# Patient Record
Sex: MALE | Race: WHITE | NOT HISPANIC OR LATINO | Employment: OTHER | URBAN - METROPOLITAN AREA
[De-identification: names, ages, dates, MRNs, and addresses within clinical notes are randomized per-mention and may not be internally consistent; named-entity substitution may affect disease eponyms.]

---

## 2022-01-24 ENCOUNTER — TELEPHONE (OUTPATIENT)
Dept: PULMONOLOGY | Facility: CLINIC | Age: 77
End: 2022-01-24
Payer: MEDICARE

## 2022-01-24 NOTE — TELEPHONE ENCOUNTER
Returned patient call his referral was received awaiting for it to be processed in system and appointment to be scheduled will notify once done. LG  ----- Message from Anastasiia Wagner sent at 1/24/2022  8:33 AM CST -----  Contact: PT      Who Called: Ag     Does the patient know what this is regarding?: [facility]  Would the patient rather a call back or a response via [facility]?  Callback   Best Call Back Number: .[phone] (home)      Additional Information:

## 2022-01-28 DIAGNOSIS — R06.02 SOB (SHORTNESS OF BREATH): Primary | ICD-10-CM

## 2022-02-01 DIAGNOSIS — R06.02 SOB (SHORTNESS OF BREATH): Primary | ICD-10-CM

## 2022-02-09 ENCOUNTER — CLINICAL SUPPORT (OUTPATIENT)
Dept: PULMONOLOGY | Facility: CLINIC | Age: 77
End: 2022-02-09
Payer: MEDICARE

## 2022-02-09 ENCOUNTER — OFFICE VISIT (OUTPATIENT)
Dept: PULMONOLOGY | Facility: CLINIC | Age: 77
End: 2022-02-09
Payer: MEDICARE

## 2022-02-09 VITALS
RESPIRATION RATE: 18 BRPM | BODY MASS INDEX: 28.23 KG/M2 | SYSTOLIC BLOOD PRESSURE: 118 MMHG | HEART RATE: 96 BPM | DIASTOLIC BLOOD PRESSURE: 72 MMHG | WEIGHT: 220 LBS | OXYGEN SATURATION: 98 % | HEIGHT: 74 IN

## 2022-02-09 DIAGNOSIS — R06.02 SOB (SHORTNESS OF BREATH): ICD-10-CM

## 2022-02-09 DIAGNOSIS — N18.9 CHRONIC KIDNEY DISEASE, UNSPECIFIED CKD STAGE: ICD-10-CM

## 2022-02-09 DIAGNOSIS — R06.02 SOB (SHORTNESS OF BREATH): Primary | ICD-10-CM

## 2022-02-09 DIAGNOSIS — R07.81 PLEURITIC CHEST PAIN: ICD-10-CM

## 2022-02-09 PROCEDURE — 94726 PLETHYSMOGRAPHY LUNG VOLUMES: CPT | Mod: S$GLB,,, | Performed by: INTERNAL MEDICINE

## 2022-02-09 PROCEDURE — 99205 OFFICE O/P NEW HI 60 MIN: CPT | Mod: 25,S$GLB,, | Performed by: INTERNAL MEDICINE

## 2022-02-09 PROCEDURE — 99205 PR OFFICE/OUTPT VISIT, NEW, LEVL V, 60-74 MIN: ICD-10-PCS | Mod: 25,S$GLB,, | Performed by: INTERNAL MEDICINE

## 2022-02-09 PROCEDURE — 94729 PR C02/MEMBANE DIFFUSE CAPACITY: ICD-10-PCS | Mod: S$GLB,,, | Performed by: INTERNAL MEDICINE

## 2022-02-09 PROCEDURE — 94726 PULM FUNCT TST PLETHYSMOGRAP: ICD-10-PCS | Mod: S$GLB,,, | Performed by: INTERNAL MEDICINE

## 2022-02-09 PROCEDURE — 94060 PR EVAL OF BRONCHOSPASM: ICD-10-PCS | Mod: S$GLB,,, | Performed by: INTERNAL MEDICINE

## 2022-02-09 PROCEDURE — 94060 EVALUATION OF WHEEZING: CPT | Mod: S$GLB,,, | Performed by: INTERNAL MEDICINE

## 2022-02-09 PROCEDURE — 94729 DIFFUSING CAPACITY: CPT | Mod: S$GLB,,, | Performed by: INTERNAL MEDICINE

## 2022-02-09 RX ORDER — ALBUTEROL SULFATE 90 UG/1
2 AEROSOL, METERED RESPIRATORY (INHALATION) EVERY 4 HOURS PRN
COMMUNITY
Start: 2021-11-26

## 2022-02-09 RX ORDER — METFORMIN HYDROCHLORIDE 1000 MG/1
1000 TABLET ORAL 2 TIMES DAILY WITH MEALS
COMMUNITY

## 2022-02-09 RX ORDER — ATORVASTATIN CALCIUM 80 MG/1
80 TABLET, FILM COATED ORAL DAILY
COMMUNITY

## 2022-02-09 RX ORDER — CLOPIDOGREL BISULFATE 75 MG/1
75 TABLET ORAL DAILY
COMMUNITY

## 2022-02-09 RX ORDER — PREDNISONE 20 MG/1
20 TABLET ORAL 2 TIMES DAILY
Qty: 20 TABLET | Refills: 0 | OUTPATIENT
Start: 2022-02-09 | End: 2022-02-19

## 2022-02-09 RX ORDER — FENOFIBRATE 48 MG/1
145 TABLET, FILM COATED ORAL DAILY
COMMUNITY

## 2022-02-09 RX ORDER — LISINOPRIL 40 MG/1
40 TABLET ORAL DAILY
COMMUNITY

## 2022-02-09 RX ORDER — AMLODIPINE BESYLATE 5 MG/1
5 TABLET ORAL 2 TIMES DAILY
COMMUNITY
Start: 2021-12-28

## 2022-02-09 NOTE — PROGRESS NOTES
Subjective:    Patient Identification:   Patient ID: Ag Villalba is a 76 y.o. male.    Referring Provider:       Chief Complaint:  Pneumonia and Shortness of Breath      History of Present Illness:    Ag Villalba is a 76 y.o. male who presents for the evaluation of above-mentioned problem.  The patient has never smoked.  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  He denies any chest or neck trauma.  He has dogs and cats at home and denies any exposure to exotic animals or recent travel.  He has never had any blood clots.  He has significant cardiac history with 3 stents placed and does take clopidogrel and sees Dr. Enriquez.  He denies any lower extremity swelling but has occasionally felt dizzy.  He does have a systolic murmur and is aware of it.  In November he was diagnosed with pneumonia and his symptoms were right-sided sharp chest pain associated with deep breathing, no fever, no cough and negative for COVID-19.  He mentions that he has been short of breath even prior to this episode but since then he has felt more short of breath with pleuritic chest pain all the time.  He even while sitting he would have chest pain.  He mentions that he saw Dr. Enriquez in August or September of last year and there was an echocardiogram performed.  He is scheduled to see him in March or April.  His pain is not reproducible.  He [facility] of the chest with contrast but he is not sure about the results.    Review of Systems:  Review of Systems   Constitutional: Negative for fever, chills, weight loss, weight gain, activity change, appetite change, fatigue, night sweats and weakness.   HENT: Negative for nosebleeds, postnasal drip, rhinorrhea, sinus pressure, sore throat, trouble swallowing, voice change, congestion, ear pain and hearing loss.    Eyes: Negative for redness and itching.    Respiratory: Positive for shortness of breath and pleurisy. Negative for cough, hemoptysis, sputum production, choking, chest tightness, wheezing, orthopnea, previous hospitialization due to pulmonary problems, asthma nighttime symptoms, dyspnea on extertion, use of rescue inhaler and Paroxysmal Nocturnal Dyspnea.    Cardiovascular: Negative for chest pain, palpitations and leg swelling.   Genitourinary: Negative for difficulty urinating and hematuria.   Endocrine: Negative for polydipsia, polyphagia, cold intolerance, heat intolerance and polyuria.    Musculoskeletal: Negative for arthralgias, back pain, gait problem, joint swelling and myalgias.   Skin: Negative for rash.   Gastrointestinal: Negative for nausea, vomiting, abdominal pain, abdominal distention and acid reflux.   Neurological: Negative for dizziness, syncope, weakness, light-headedness and headaches.   Hematological: Negative for adenopathy. Does not bruise/bleed easily and no excessive bruising.   Psychiatric/Behavioral: Negative for confusion and sleep disturbance. The patient is not nervous/anxious.          Allergies: Review of patient's allergies indicates:  No Known Allergies    Medications:      Aloe clip 10, calcium/vitamin-D, clopidogrel, cyclobenzaprine, gabapentin, glipizide, hydroxyzine, metformin, lisinopril, atorvastatin, fenofibrate  Past Medical History:      Coronary artery disease, aortic stenosis, carpal tunnel syndrome, chronic kidney disease, hypertension, hearing loss, hyperlipidemia, type 2 diabetes mellitus    Family History:      No family history of cancer     Social History:      Past Surgical History:   Procedure Laterality Date    CARDIAC CATHETERIZATION         Physical Exam:  Vitals:    02/09/22 0849   BP: 118/72   Pulse: 96   Resp: 18     Physical Exam   Constitutional: He is oriented to person, place, and time. He appears not cachectic. No distress.   HENT:   Head: Normocephalic.   Right Ear: External ear normal.    Left Ear: External ear normal.   Nose: Nose normal. No mucosal edema. No polyps.   Mouth/Throat: Oropharynx is clear and moist. Normal dentition. No oropharyngeal exudate.   Neck: No JVD present. No tracheal deviation present. No thyromegaly present.   Cardiovascular: Normal rate, regular rhythm and intact distal pulses. Exam reveals no gallop and no friction rub.   Murmur heard.  Pulmonary/Chest: Normal expansion, symmetric chest wall expansion, effort normal and breath sounds normal. No stridor. No respiratory distress. He has no decreased breath sounds. He has no wheezes. He has no rhonchi. He has no rales. Chest wall is not dull to percussion. He exhibits no tenderness. Negative for egophony. Negative for tactile fremitus.   Abdominal: Soft. Bowel sounds are normal. He exhibits no distension and no mass. There is no hepatosplenomegaly. There is no abdominal tenderness. There is no rebound and no guarding. No hernia.   Musculoskeletal:         General: No tenderness, deformity or edema. Normal range of motion.      Cervical back: Normal range of motion and neck supple.   Lymphadenopathy: No supraclavicular adenopathy is present.     He has no cervical adenopathy.     He has no axillary adenopathy.   Neurological: He is alert and oriented to person, place, and time. He has normal reflexes. He displays normal reflexes. No cranial nerve deficit. He exhibits normal muscle tone.   Skin: Skin is warm and dry. No rash noted. He is not diaphoretic. No cyanosis or erythema. No pallor. Nails show no clubbing.   Psychiatric: He has a normal mood and affect. His behavior is normal. Judgment and thought content normal.         X-Ray Chest PA And Lateral                                RADIOLOGY REPORT        PT NAME: ALEX ASHLEY      [facility]     : 1945 M 76             [address]    ACCT: [id number]                                              [facility] Rec #:  [id number]                                        [id number]    Patient Location: [facility].RAD/             Procedure: CHEST 2 VIEWS    REQUISITION #: [id number]      REPORT #: [id number]           DATE OF EXAM: 02/02/22    TIME OF EXAM: 0852           PROCEDURE: CHEST 2 VIEWS    CLINICAL DATA:    Right lung pain        TECHNIQUE:    Views:  A single AP view of the chest.    Limitations: The images are technically satisfactory.        COMPARISON:    No prior relevant studies are available.        FINDINGS:    Cardiovascular: Normal.        Lungs and pleura: Normal.        Mediastinal and hilar structures: Normal.        Osseous structures:    1. Degenerative changes noted in spine.        Additional findings: None seen.        IMPRESSION:    1.  Negative examination.            This document was created using a voice recognition transcribing system.   Incorrect words or phrases may have been missed during proof reading. Please   interpret accordingly or contact the radiologist for clarification if   necessary.        DICTATING PHYSICIAN:  INES TEMPLE MD                   Date Dictated: 02/02/22 0930        Signed By:  INES TEMPLE MD <Electronically signed by INES TEMPLE MD in OV>    Date Signed:  02/02/22 0930     CC: ESE POWER MD ; ESE POWER MD      ADMITTING PHYSICIAN:                                                                                                    ORDERING PHY: ESE POWER MD                                                                                                                                                      ATTENDING PHY: ESE POWER MD    Patient Status:  REG CLI    Admit Service Date: 02/02/22                Accessory Clinical Data:  Chest x-ray:  I have personally reviewed his chest x-ray from 02/02/2022 and I do not see any acute pulmonary infiltrates or pleural disease.    CT scan:  CT angiogram report from 11/24/20[facility] from [facility] was  reviewed.  Patient had no evidence of pulmonary embolism and a small loculated right-sided pleural effusion with right lower lobe airspace disease with consolidation with peribronchial thickening.  There was mild left lung base atelectasis noted.  There are scattered pulmonary nodules measuring up to 6 mm.  Optional follow-up CT chest in 12 months was recommended.  Ascending aorta was 4.2 cm without any dissection.    PFTs:  Essentially unremarkable    6MWT:  None available    TTE:  None available    Lab data:    All radiographic imaging of the chest, PFT tracings/data, and 6MWT data have been independently reviewed and interpreted unless otherwise specified.     Assessment and Plan:        Problem List Items Addressed This Visit    None     Visit Diagnoses     SOB (shortness of breath)    -  Primary    Relevant Orders    CT Chest With Contrast    Creatinine, serum    Stress test, pulmonary    Pleuritic chest pain        Relevant Orders    CT Chest With Contrast    Creatinine, serum    Chronic kidney disease, unspecified CKD stage        Relevant Orders    Creatinine, serum         Orders Placed This Encounter   Procedures    CT Chest With Contrast     Standing Status:   Future     Standing Expiration Date:   2/9/2023     Order Specific Question:   Is the patient allergic to iodine or contrast?     Answer:   No     Order Specific Question:   Is the patient on ANY Metformin drug such as Glucophage/Glucovance?           Should be off drug 48 hours after contrast. Check renal function before restart.     Answer:   No     Order Specific Question:   History of Kidney Disease - including: decreased kidney function, dialysis, kidney transplay, single kidney, kidney cancer, kidney surgery?     Answer:   Decreased Kidney Function     Order Specific Question:   Does the patient have high blood pressure requiring medical treatment?     Answer:   Yes     Order Specific Question:   Diabetes?     Answer:   Yes     Order Specific  Question:   May the Radiologist modify the order per protocol to meet the clinical needs of the patient?     Answer:   Yes    Creatinine, serum     Standing Status:   Future     Number of Occurrences:   1     Standing Expiration Date:   4/10/2023    Stress test, pulmonary     Standing Status:   Future     Standing Expiration Date:   2/9/2023     Order Specific Question:   Reason for study     Answer:   Oxygen prescription      Retrieve CT angiogram from [place] 2021 from [facility].  Report received, reviewed and dictated.  Retrieve echocardiogram and cardiology progress notes from Dr. Enriquez is office, awaiting.  Will like to see the degree of aortic stenosis contributing to shortness of breath.    Unsure what the lung parenchyma and pleuritic process look on imaging at this point, with constant right-sided pleuritic chest pain and shortness of breath, will like to rule out pulmonary embolism and or pleuritic disease.  Will obtain CT angiogram.  If CT angiogram does not show any acute pulmonary emboli, will consider trial of steroids.    PFTs are within normal limits and current chest x-ray is unremarkable, unable to explain significant exertional as well as resting shortness of breath with normal lung functions.  Obtain 6 minutes walk test today.    More than 50% of 60 min time was spent face-to-face on counseling, in reviewing the imaging studies, reviewing notes from primary care provider, performing appropriate examination, counseling and educating the patient regarding the findings on CT scan, ordering medications and appropriate follow-up imaging studies, documenting clinical information in the electronic medical record, care coordination as well as communicating with the cardiology office and [facility].    Follow up in about 2 weeks (around 2/23/2022).  Thank you very much for involving me in the care of this patient.  Please do not hesitate to reach me if you have any further questions or  concerns.    This note is dictated on M*Modal word recognition program.  There are word recognition mistakes that are occasionally missed on review.     This is the addendum to this morning's note.  Reviewed the CT angiogram, patient has no evidence of pulmonary embolism.  There are multiple subcentimeter trick nodules the largest 4 mm on both the right and left side which will need to be monitored with a CT scan in 12 months.  There is a tiny right-sided pleural effusion with compression atelectasis and may be the cause of pleuritic chest pain.  This effusion is very small to be safe to put a needle there.  With the absence of infectious symptoms, it will be worthwhile to try some oral steroids or gabapentin.  Will send some steroids to his pharmacy.  Also received his echocardiogram results from his cardiologist office.  Paradoxical shortness of breath has been mentioned in those notes.  Echocardiogram from September 2021 shows a peak aortic valve gradient of 48 mm of mercury and a mean gradient of 28 mm of mercury.  There was trivial pericardial effusion without any evidence of tamponade.  Aortic valve peak velocity was 3.48 m/sec.  Aortic valve area based upon echo from 2020 was 1.36 cm sq.  ?  If shortness of breath is related to cardiac causes including moderate aortic valve stenosis.

## 2022-02-18 ENCOUNTER — TELEPHONE (OUTPATIENT)
Dept: PULMONOLOGY | Facility: CLINIC | Age: 77
End: 2022-02-18
Payer: MEDICARE

## 2022-02-18 NOTE — TELEPHONE ENCOUNTER
Faxed records as requested by patient. LG  ----- Message from Delaney Jurado sent at 2/18/2022  8:44 AM CST -----  Regarding: pt advice  Contact: Pt  Pt is calling to get copies of the xray and ct scan faxed to his cardiologist Dr Enriquez at [phone] Attmarc Fagan. Please call back at [phone]//thank you acc

## 2022-04-29 LAB
CREAT SERPL-MCNC: 1.53 MG/DL (ref 0.7–1.3)
GFR ESTIMATION: 44

## 2022-08-05 ENCOUNTER — OFFICE VISIT (OUTPATIENT)
Dept: PULMONOLOGY | Facility: CLINIC | Age: 77
End: 2022-08-05
Payer: MEDICARE

## 2022-08-05 ENCOUNTER — CLINICAL SUPPORT (OUTPATIENT)
Dept: PULMONOLOGY | Facility: CLINIC | Age: 77
End: 2022-08-05
Payer: MEDICARE

## 2022-08-05 VITALS
OXYGEN SATURATION: 98 % | HEART RATE: 74 BPM | BODY MASS INDEX: 27.98 KG/M2 | BODY MASS INDEX: 28.58 KG/M2 | WEIGHT: 218 LBS | HEIGHT: 74 IN | WEIGHT: 222.69 LBS | RESPIRATION RATE: 18 BRPM | DIASTOLIC BLOOD PRESSURE: 72 MMHG | SYSTOLIC BLOOD PRESSURE: 122 MMHG | HEIGHT: 74 IN

## 2022-08-05 DIAGNOSIS — R06.02 SOB (SHORTNESS OF BREATH): ICD-10-CM

## 2022-08-05 DIAGNOSIS — R07.81 PLEURITIC CHEST PAIN: ICD-10-CM

## 2022-08-05 DIAGNOSIS — R91.8 LUNG NODULES: ICD-10-CM

## 2022-08-05 PROCEDURE — 94618 PULMONARY STRESS TESTING: ICD-10-PCS | Mod: S$GLB,,, | Performed by: INTERNAL MEDICINE

## 2022-08-05 PROCEDURE — 99215 OFFICE O/P EST HI 40 MIN: CPT | Mod: 25,S$GLB,,

## 2022-08-05 PROCEDURE — 99215 PR OFFICE/OUTPT VISIT, EST, LEVL V, 40-54 MIN: ICD-10-PCS | Mod: 25,S$GLB,,

## 2022-08-05 PROCEDURE — 94618 PULMONARY STRESS TESTING: CPT | Mod: S$GLB,,, | Performed by: INTERNAL MEDICINE

## 2022-08-05 NOTE — PROCEDURES
"[facility]) - Pulmonary Function  Six Minute Walk     SUMMARY     Ordering Provider: Dr. Rodriguez   Interpreting Provider: Dr. Rodriguez  Performing nurse/tech/RT: Mango RRT  Diagnosis: Shortness of Breath  Height: 6' 2" (188 cm)  Weight: 101 kg (222 lb 10.6 oz)  BMI (Calculated): 28.6   Patient Race:             Phase Oxygen Assessment Supplemental O2 Heart   Rate Blood Pressure [place] Dyspnea Scale Rating   Resting 98 % Room Air 65 bpm 113/69 4   Exercise        Minute        1 98 % Room Air 72 bpm     2 98 % Room Air 81 bpm     3 97 % Room Air 82 bpm     4 97 % Room Air 84 bpm     5 97 % Room Air 85 bpm     6  96 % Room Air 81 bpm 119/65 4   Recovery        Minute        1 98 % Room Air 73 bpm     2 98 % Room Air 65 bpm     3 98 % Room Air 58 bpm     4 98 % Room Air 61 bpm 119/69 4     Six Minute Walk Summary  6MWT Status: completed without stopping  Patient Reported: Dyspnea, Other (Comment) (Hip Pain)     Interpretation:  Did the patient stop or pause?: No                                         Total Time Walked (Calculated): 360 seconds  Final Partial Lap Distance (feet): 50 feet  Total Distance Meters (Calculated): 381 meters  Predicted Distance Meters (Calculated): 554.88 meters  Percentage of Predicted (Calculated): 68.66  Peak VO2 (Calculated): 15.41  Mets: 4.4  Has The Patient Had a Previous Six Minute Walk Test?: No       Previous 6MWT Results  Has The Patient Had a Previous Six Minute Walk Test?: No    Physician Interpretation:  No exertional hypoxia.  Patient walked 68% of predicted distance in 6 minutes.  SUMEET 6 to 6 stable.  Heart rate 65 to 81 bpm.  /69 to 119/65.  -Carleen Rodriguez MD  "

## 2022-08-05 NOTE — PROGRESS NOTES
Subjective:    Patient Identification:  Patient ID: Ag Vilallba is a 77 y.o. male.    Referring Provider:  No ref. provider found     Chief Complaint:  Follow-up      History of Present Illness:    Ag Villalba is a 77 y.o. male who presents for a follow up after an initial visit with Dr. Rodriguez with elvauation of SOB and history of pneumonias. He had a CTA  done in February that was reviewed by Dr. Rodriguez  which showed  no evidence of pulmonary embolism.  There were multiple subcentimeter trick nodules the largest 4 mm on both the right and left side which will need to be monitored with a CT scan in 12 months. A tiny right sided pleural effusion was also noted, however too small for thoracentesis. He was given a 10 day course of prednisone and returns today for a 6MWT. He was only able to complete 7/10 days of prednisone due to development of a rash that he contributed to the medication, so he terminated the course at the time. He is still SOB. Recent cardiac workup revealed moderate AS which is likely the cause of his persistent SOB that is unrelieved by albuterol.    Review of Systems:  Review of Systems   Constitutional: Negative for fever, chills, appetite change, night sweats and weakness.   HENT: Negative for postnasal drip, rhinorrhea, sore throat, trouble swallowing, voice change, congestion and ear pain.    Respiratory: Positive for dyspnea on extertion. Negative for hemoptysis.    Cardiovascular: Negative for chest pain, palpitations and leg swelling.   Genitourinary: Negative for difficulty urinating and hematuria.   Endocrine: Negative for polydipsia, polyphagia, cold intolerance, heat intolerance and polyuria.    Musculoskeletal: Negative for joint swelling and myalgias.   Skin: Negative for rash.   Gastrointestinal: Negative for nausea, vomiting, abdominal pain and abdominal distention.   Neurological: Negative for dizziness, syncope, light-headedness and headaches.   Psychiatric/Behavioral:  Negative for confusion and sleep disturbance. The patient is not nervous/anxious.        Allergies: Review of patient's allergies indicates:  No Known Allergies    Medications:      Past Medical History:      Past Medical History:   Diagnosis Date    Coronary artery disease     Diabetes mellitus     Hypertension        Family History:      No family history on file.     Social History:      Past Surgical History:   Procedure Laterality Date    CARDIAC CATHETERIZATION         Physical Exam:  Vitals:    08/05/22 1344   BP: 122/72   Pulse: 74   Resp: 18     Physical Exam   Constitutional: He is oriented to person, place, and time. He appears not cachectic. No distress.   HENT:   Head: Normocephalic.   Right Ear: External ear normal.   Left Ear: External ear normal.   Nose: Nose normal. No mucosal edema. No polyps.   Mouth/Throat: Oropharynx is clear and moist. Normal dentition. No oropharyngeal exudate. Mallampati Score: III.   Neck: No JVD present. No tracheal deviation present. No thyromegaly present.   Cardiovascular: Normal rate, regular rhythm, normal heart sounds and intact distal pulses. Exam reveals no gallop and no friction rub.   No murmur heard.  Pulmonary/Chest: Normal expansion, symmetric chest wall expansion, effort normal and breath sounds normal. No stridor. No respiratory distress. He has no decreased breath sounds. He has no wheezes. He has no rhonchi. He has no rales. Chest wall is not dull to percussion. He exhibits no tenderness. Negative for egophony. Negative for tactile fremitus.   Abdominal: Soft. Bowel sounds are normal. He exhibits no distension and no mass. There is no hepatosplenomegaly. There is no abdominal tenderness. There is no rebound and no guarding. No hernia.   Musculoskeletal:         General: No tenderness, deformity or edema. Normal range of motion.      Cervical back: Normal range of motion and neck supple.   Lymphadenopathy: No supraclavicular adenopathy is present.     He  has no cervical adenopathy.     He has no axillary adenopathy.   Neurological: He is alert and oriented to person, place, and time. He has normal reflexes. He displays normal reflexes. No cranial nerve deficit. He exhibits normal muscle tone.   Skin: Skin is warm and dry. No rash noted. He is not diaphoretic. No cyanosis or erythema. No pallor. Nails show no clubbing.   Psychiatric: He has a normal mood and affect. His behavior is normal. Judgment and thought content normal.           No results found for: PREFEV1, UIQ9TKVGYW, PREFVC, FVCPREREF, XBKROE1ZBW, KNZ0VEQITAO, QURK3UVL, MDFH5UBI, PREDLCO, DLCOSBPRRF, DLCOADJPRE, DLCOCSBRPRRF, POSTFEV1, POSTFVC, GSSESUP2EYH   CT Chest With Contrast                                RADIOLOGY REPORT        PT NAME: ALEX ASHLEY      [facility]     : 1945 M 76             [address] [facility]    ACCT: [id number]                                              [facility] Rec #: [id number]                                        [id number]    [facility]             Procedure: CHEST THORAX  W CONT    REQUISITION #: [id number]      REPORT #: [id number]           DATE OF EXAM: 22    TIME OF EXAM: 1100       CT CHEST        CMS MANDATED QUALITY DATA CT RADIATION 436    *All CT scans at this facility uses dose modulation and/or weight-based   dosing when appropriate to reduce radiation dose to as low as reasonably   achievable.        Clinical history: Dyspnea with right-sided chest pain        Technique:    Acquisition: Contiguous scan slices were obtained from the apex of the lungs   through the diaphragms.    Reconstructions: Axial, coronal and sagittal. reconstructions of the data   set were obtained.    Contrast:    IV: 100 cc of Isovue 300 intravenously administered.    Estimated radiation dose: 13.80 mSv.        Comparison: No prior relevant studies are available.        Findings:        Lungs, pleura and large  airways:    Right-sided pleural effusion with adjacent compressive atelectasis.        A 4 mm nodule is noted in the left lower lobe (4:39).    A 3 mm nodule is noted more laterally in the left lower lobe (4:39).    A 4 mm nodule is noted in the lingula (4:34).    A 3 mm nodule is in the right upper lobe (4:30).    A 3 mm nodule is noted in the right upper lobe (4:16).        Heart: No cardiomegaly. No pericardial effusion.        Systemic vasculature: Normal.        Pulmonary vasculature: Grossly normal.        Mediastinal and hilar structures: No mediastinal mass. No enlarged or   morphologically abnormal mediastinal or hilar lymph nodes.        Chest wall, axilla and lower neck: The thyroid gland is normal. The lower   neck soft tissues are unremarkable. No axillary lesions.        Upper abdomen: No acute findings.        Osseous structures: No acute or aggressive bony lesions.        Impression        1.  Right-sided pleural effusion.        2.  Scattered pulmonary nodules measuring up to 4 mm. Consider follow-up   evaluation with chest CT in 12 months.                DICTATING PHYSICIAN:  ESTEFANÍA MONACO MD                   Date Dictated: 02/09/22 1127        Signed By:  ESTEFANÍA MONACO MD <Electronically signed by ESTEFANÍA MONACO MD in OV>    Date Signed:  02/09/22 1132     CC: ESE POWER MD ; ESE POWER MD      ADMITTING PHYSICIAN:                                                                                                    ORDERING PHY: ESE POWER MD                                                                                                                                                      ATTENDING PHY: ESE POWER MD    Patient Status:  REG CLI    Admit Service Date: 02/09/22                Accessory Clinical Data:  Chest x-ray:    CT scan:     PFTs: noted to be WNL on previous visit. Unable to explain significant exertional as well as resting shortness of breath  with normal lung functions    6MWT:     TTE:echocardiogram results from his cardiologist office.  Paradoxical shortness of breath has been mentioned in those notes.  Echocardiogram from September 2021 shows a peak aortic valve gradient of 48 mm of mercury and a mean gradient of 28 mm of mercury.  There was trivial pericardial effusion without any evidence of tamponade.  Aortic valve peak velocity was 3.48 m/sec.  Aortic valve area based upon echo from 2020 was 1.36 cm sq.  ?  If shortness of breath is related to cardiac causes including moderate aortic valve stenosis.    Lab data:    All radiographic imaging of the chest, PFT tracings/data, and 6MWT data have been independently reviewed and interpreted unless otherwise specified.     Assessment and Plan:      Problem List Items Addressed This Visit        Pulmonary    Lung nodules    Overview     Multiple scattered lung nodules bilaterally with the largest measuring up to 4mm.           Current Assessment & Plan     Proceed with recommended follow up CT scan in 12 months.           SOB (shortness of breath)    Current Assessment & Plan     Question of SOB being related to cardiac causes such as moderate AV stenosis.    6MWT today does not support a need for supplemental O2.            Pleuritic chest pain    Current Assessment & Plan     S/p 10 day course of prednisone. Suspected to be related to small right pleural effusion.                No orders of the defined types were placed in this encounter.       High complexity case.  60 min were spent in reviewing the important data including imaging studies on a different EMR, laboratory data, notes from other consultants and reviewing care with other providers with more than 50% of the time spent face-to-face on counseling, explaining the disease process, progression, importance of compliance with prescribed medications and the importance of follow-up.    Follow up in about 6 months (around 2/5/2023) for follow up CT.

## 2022-08-05 NOTE — ASSESSMENT & PLAN NOTE
Question of SOB being related to cardiac causes such as moderate AV stenosis.    6MWT today does not support a need for supplemental O2.

## 2023-01-12 DIAGNOSIS — G56.03 CARPAL TUNNEL SYNDROME, BILATERAL: Primary | ICD-10-CM

## 2023-01-23 ENCOUNTER — OFFICE VISIT (OUTPATIENT)
Dept: ORTHOPEDICS | Facility: CLINIC | Age: 78
End: 2023-01-23
Payer: MEDICARE

## 2023-01-23 DIAGNOSIS — G56.03 BILATERAL CARPAL TUNNEL SYNDROME: Primary | ICD-10-CM

## 2023-01-23 DIAGNOSIS — G56.03 CARPAL TUNNEL SYNDROME, BILATERAL: Primary | ICD-10-CM

## 2023-01-23 DIAGNOSIS — G56.03 CARPAL TUNNEL SYNDROME, BILATERAL: ICD-10-CM

## 2023-01-23 LAB
ANION GAP SERPL CALC-SCNC: 8 MMOL/L (ref 3–11)
BASOPHILS NFR BLD: 1.3 % (ref 0–3)
BUN SERPL-MCNC: 19 MG/DL (ref 7–18)
BUN/CREAT SERPL: 14.96 RATIO (ref 7–18)
CALCIUM SERPL-MCNC: 9.5 MG/DL (ref 8.8–10.5)
CHLORIDE SERPL-SCNC: 107 MMOL/L (ref 100–108)
CO2 SERPL-SCNC: 26 MMOL/L (ref 21–32)
CREAT SERPL-MCNC: 1.27 MG/DL (ref 0.7–1.3)
EOSINOPHIL NFR BLD: 5.4 % (ref 1–3)
ERYTHROCYTE [DISTWIDTH] IN BLOOD BY AUTOMATED COUNT: 13.1 % (ref 12.5–18)
GFR ESTIMATION: 55
GLUCOSE SERPL-MCNC: 129 MG/DL (ref 70–110)
HCT VFR BLD AUTO: 38.6 % (ref 42–52)
HGB BLD-MCNC: 13.3 G/DL (ref 14–18)
LYMPHOCYTES NFR BLD: 20.1 % (ref 25–40)
MCH RBC QN AUTO: 31.1 PG (ref 27–31.2)
MCHC RBC AUTO-ENTMCNC: 34.5 G/DL (ref 31.8–35.4)
MCV RBC AUTO: 90.2 FL (ref 80–97)
MONOCYTES NFR BLD: 6.6 % (ref 1–15)
NEUTROPHILS # BLD AUTO: 4.68 10*3/UL (ref 1.8–7.7)
NEUTROPHILS NFR BLD: 66 % (ref 37–80)
NUCLEATED RED BLOOD CELLS: 0 %
PLATELETS: 225 10*3/UL (ref 142–424)
POTASSIUM SERPL-SCNC: 4.2 MMOL/L (ref 3.6–5.2)
RBC # BLD AUTO: 4.28 10*6/UL (ref 4.7–6.1)
SODIUM BLD-SCNC: 141 MMOL/L (ref 135–145)
WBC # BLD: 7.1 10*3/UL (ref 4.6–10.2)

## 2023-01-23 PROCEDURE — 99202 PR OFFICE/OUTPT VISIT, NEW, LEVL II, 15-29 MIN: ICD-10-PCS | Mod: S$GLB,,, | Performed by: ORTHOPAEDIC SURGERY

## 2023-01-23 PROCEDURE — 99202 OFFICE O/P NEW SF 15 MIN: CPT | Mod: S$GLB,,, | Performed by: ORTHOPAEDIC SURGERY

## 2023-01-23 RX ORDER — ASPIRIN 81 MG/1
81 TABLET ORAL DAILY
COMMUNITY

## 2023-01-23 NOTE — PROGRESS NOTES
Subjective:      Patient ID: Ag Villalba is a 77 y.o. male.    Chief Complaint: Pain of the Right Hand and Pain of the Left Hand    HPI 77-year-old gentleman who comes in with a greater than 1 year history of numbness in both hands.  He has EMG and nerve conduction studies showing bilateral severe carpal tunnel syndrome as well as ulnar nerve involvement at the wrist.  He awakens with severe numbness in the median nerve distribution.    Review of Systems   Constitutional: Negative for fever and weight loss.   Cardiovascular:  Negative for chest pain and leg swelling.   Musculoskeletal:  Negative for arthritis, joint pain, joint swelling, muscle weakness and stiffness.   Gastrointestinal:  Negative for change in bowel habit.   Genitourinary:  Negative for bladder incontinence and hematuria.   Neurological:  Positive for focal weakness, numbness, paresthesias and sensory change.       Objective:      Active and passive range of motion of both wrists is normal.  He has decreased sensation to light touch in the median nerve distribution and a positive carpal compression test.  He has normal capillary refill.      Ortho/SPM Exam            Assessment:       Encounter Diagnosis   Name Primary?    Bilateral carpal tunnel syndrome Yes          Plan:       Ag was seen today for pain and pain.    Diagnoses and all orders for this visit:    Bilateral carpal tunnel syndrome    I discussed with him options including injections and splinting.  He would like to proceed with carpal tunnel release.  We will do this in the near future at his convenience.      He understands he may have incomplete relief given the severity of changes on his nerve conduction studies.

## 2023-02-01 ENCOUNTER — OUTSIDE PLACE OF SERVICE (OUTPATIENT)
Dept: ORTHOPEDICS | Facility: CLINIC | Age: 78
End: 2023-02-01
Payer: MEDICARE

## 2023-02-01 LAB — GLUCOSE SERPL-MCNC: 122 MG/DL (ref 70–105)

## 2023-02-01 PROCEDURE — 64721 PR REVISE MEDIAN N/CARPAL TUNNEL SURG: ICD-10-PCS | Mod: LT,,, | Performed by: ORTHOPAEDIC SURGERY

## 2023-02-01 PROCEDURE — 64719 PR REVISE ULNAR NERVE AT WRIST: ICD-10-PCS | Mod: 59,51,LT, | Performed by: ORTHOPAEDIC SURGERY

## 2023-02-01 PROCEDURE — 64719 REVISE ULNAR NERVE AT WRIST: CPT | Mod: 59,51,LT, | Performed by: ORTHOPAEDIC SURGERY

## 2023-02-01 PROCEDURE — 64721 CARPAL TUNNEL SURGERY: CPT | Mod: LT,,, | Performed by: ORTHOPAEDIC SURGERY

## 2023-02-09 ENCOUNTER — OFFICE VISIT (OUTPATIENT)
Dept: ORTHOPEDICS | Facility: CLINIC | Age: 78
End: 2023-02-09
Payer: MEDICARE

## 2023-02-09 DIAGNOSIS — G56.03 CARPAL TUNNEL SYNDROME, BILATERAL: Primary | ICD-10-CM

## 2023-02-09 PROCEDURE — 99024 PR POST-OP FOLLOW-UP VISIT: ICD-10-PCS | Mod: S$GLB,POP,, | Performed by: ORTHOPAEDIC SURGERY

## 2023-02-09 PROCEDURE — 99024 POSTOP FOLLOW-UP VISIT: CPT | Mod: S$GLB,POP,, | Performed by: ORTHOPAEDIC SURGERY

## 2023-02-09 RX ORDER — HYDROCODONE BITARTRATE AND ACETAMINOPHEN 5; 325 MG/1; MG/1
1 TABLET ORAL
COMMUNITY
Start: 2023-02-01

## 2023-02-09 NOTE — PROGRESS NOTES
Subjective:      Patient ID: Ag Villalba is a 77 y.o. male.    Chief Complaint: Post-op Evaluation of the Left Hand    HPI patient is 10 days out from his carpal tunnel release.  He has no complaints.    ROS unchanged from prior visit      Objective:       Incision is clean.  There is no drainage.      Ortho/SPM Exam            Assessment:       Encounter Diagnosis   Name Primary?    Carpal tunnel syndrome, bilateral Yes          Plan:       Ag was seen today for post-op evaluation.    Diagnoses and all orders for this visit:    Carpal tunnel syndrome, bilateral    Sutures are removed today and he is instructed in massaging his scar.  Return p.r.n.

## 2025-04-08 ENCOUNTER — OFFICE VISIT (OUTPATIENT)
Dept: CARDIOTHORACIC SURGERY | Facility: CLINIC | Age: 80
End: 2025-04-08
Payer: MEDICARE

## 2025-04-08 VITALS
DIASTOLIC BLOOD PRESSURE: 74 MMHG | RESPIRATION RATE: 18 BRPM | SYSTOLIC BLOOD PRESSURE: 112 MMHG | BODY MASS INDEX: 27.7 KG/M2 | OXYGEN SATURATION: 97 % | HEIGHT: 74 IN | WEIGHT: 215.88 LBS | HEART RATE: 87 BPM

## 2025-04-08 DIAGNOSIS — I35.0 NONRHEUMATIC AORTIC VALVE STENOSIS: Primary | ICD-10-CM

## 2025-04-08 DIAGNOSIS — Z76.89 ESTABLISHING CARE WITH NEW DOCTOR, ENCOUNTER FOR: Primary | ICD-10-CM

## 2025-04-08 DIAGNOSIS — Z76.89 ESTABLISHING CARE WITH NEW DOCTOR, ENCOUNTER FOR: ICD-10-CM

## 2025-04-08 RX ORDER — GABAPENTIN 300 MG/1
600 CAPSULE ORAL
COMMUNITY

## 2025-04-08 RX ORDER — SEMAGLUTIDE 1.34 MG/ML
1 INJECTION, SOLUTION SUBCUTANEOUS
COMMUNITY

## 2025-04-08 RX ORDER — HYDROXYZINE HYDROCHLORIDE 25 MG/1
25 TABLET, FILM COATED ORAL
COMMUNITY

## 2025-04-08 RX ORDER — LANOLIN ALCOHOL/MO/W.PET/CERES
1 CREAM (GRAM) TOPICAL 2 TIMES DAILY
COMMUNITY
Start: 2025-03-28

## 2025-04-08 RX ORDER — GLIMEPIRIDE 1 MG/1
1.5 TABLET ORAL
COMMUNITY
End: 2025-04-10

## 2025-04-08 RX ORDER — METOPROLOL TARTRATE 50 MG/1
TABLET ORAL
COMMUNITY
Start: 2025-01-07

## 2025-04-08 NOTE — PROGRESS NOTES
Subjective:      Patient ID: Ag Villalba is a 79 y.o. male who presents for evaluation of coronary artery disease and aortic stenosis    Chief Complaint: New Pt and TAVR consult    HPI 79-year-old male presents with past medical history significant for coronary artery disease with multiple PCI, aortic stenosis, type 2 diabetes mellitus, hypertension, hyperlipidemia, degenerative disc disease, agent orange exposure presents  For surgical evaluation of Aortic Stenosis.  Patient is referred by her cardiologist Dr. Thomas.  Symptoms have included dyspnea on exertion, dizziness, in mild chest pressure. He has an appointment with Dr. Paiz Friday. He was referred to Dr. Bello for renal mass    Left heart catheterization 04/04/2025 distal left main/ostial LAD stenosis approximately 30%, lad patent stent complex some LAD across the 2 as well as mid to distal LAD across D3.  There is no InStent restenosis scrub the mid to distal stent.  The mid stent had proximally 50 60% in stent restenosis there was plaque shift in the diagonal 2 branch with ostial 80% stenosis or diagonal 2.  Just distal to this in the proximal-to there was 80-90% stenosis.  Diagonal 2 was medium to large caliber vessel supplying a relatively large territory.  Diagonal 1 was small in caliber with no significant stenosis.  The distal LAD demonstrated a 50% stenosis.  Left circumflex proximal chronic total occlusion which was old and well collaterals from right-to-left as well as left-to-left collaterals.  Right coronary artery large dominant vessel there was multiple stent complexes in the right coronary artery with mild luminal irregularities and nonobstructive disease appreciated.  Areas proximally 20-30% InStent restenosis however no obstructive lesion was identified.    TTE 02/26/2025 ejection fraction 55-60%, moderate to severe stenosis with mean gradient 38 mm Hg, aortic valve area 1    TTE 02/12/2025 ejection fraction 60 65%,  mild-to-moderate mitral regurgitation, aortic valve moderate stenosis with mean systolic gradient 29 mm Hg,    Twelve lead EKG 03/27/2025 sinus rhythm with occasional PVC     CTA chest 03/28/2025 cardiomegaly and severe coronary calcification, bilateral pulmonary nodules, recommend follow up CT in 6 months    CTA abdomen/pelvis 03/27/2025 mild aortoiliac arthrosclerosis, 1.5 cm enhancing lesion in the lateral right kidney likely small renal cell carcinoma.    Pulmonary function test 03/27/2025 FEV 1 3.22 or 98% predicted, DLCO 22.05 or 80% predicted      Carotid ultrasound 03/27/2025-no flow-limiting stenosis  Notable labs 03/27/2025, white blood cell 6.4, hemoglobin 14.3, hematocrit 43, platelet count 218, BUN 19, creatinine 1.41, GFR 51, sodium 141, potassium 4.2, CO2 27, albumin 4, AST 20, ALT 29, alkaline phosphate 47, magnesium 1.2, A1c 6.5, UA clean    Valve planning 26 mm -0.1%, 29 mm 24.9% over sizing    STS 2%    Frailty 1/4  Review of Systems   Constitutional: Positive for malaise/fatigue. Negative for chills and fever.   HENT:  Negative for congestion, ear pain and hoarse voice.    Eyes:  Negative for blurred vision and double vision.   Cardiovascular:  Positive for chest pain and dyspnea on exertion. Negative for orthopnea and palpitations.   Respiratory:  Negative for cough and shortness of breath.    Skin:  Negative for poor wound healing and rash.   Musculoskeletal:  Negative for back pain and joint pain.   Gastrointestinal:  Negative for abdominal pain, constipation and diarrhea.   Genitourinary:  Negative for dysuria.   Neurological:  Negative for dizziness and focal weakness.      Past Medical History:   Diagnosis Date    Aortic stenosis     Coronary artery disease     Diabetes mellitus     History of colonic polyps     HLD (hyperlipidemia)     Hx of degenerative disc disease     Hypertension       Past Surgical History:   Procedure Laterality Date    CARDIAC CATHETERIZATION  03/2017     "CARPAL TUNNEL RELEASE Left 02/01/2023    With Exploration of Guyon's Canal    CATARACT EXTRACTION      CORONARY ANGIOPLASTY WITH STENT PLACEMENT  07/2016    distal RCA    CORONARY ANGIOPLASTY WITH STENT PLACEMENT  2022    LAD    KNEE SURGERY Left     REPAIR, RETINAL DETACHMENT Left       No family history on file.   Social History     Socioeconomic History    Marital status:    Tobacco Use    Smoking status: Never    Smokeless tobacco: Never   Substance and Sexual Activity    Alcohol use: Not Currently    Drug use: Never        Medication List with Changes/Refills   Current Medications    ALBUTEROL (PROVENTIL/VENTOLIN HFA) 90 MCG/ACTUATION INHALER    Inhale 2 puffs into the lungs every 4 (four) hours as needed.    AMLODIPINE (NORVASC) 5 MG TABLET    Take 5 mg by mouth 2 (two) times daily.    ASPIRIN (ECOTRIN) 81 MG EC TABLET    Take 81 mg by mouth once daily.    ATORVASTATIN (LIPITOR) 80 MG TABLET    Take 80 mg by mouth once daily.    CLOPIDOGREL (PLAVIX) 75 MG TABLET    Take 75 mg by mouth once daily.    FENOFIBRATE (TRICOR) 48 MG TABLET    Take 145 mg by mouth once daily.    GABAPENTIN (NEURONTIN) 300 MG CAPSULE    Take 600 mg by mouth.    GLIMEPIRIDE (AMARYL) 1 MG TABLET    Take 1.5 mg by mouth.    HYDROCODONE-ACETAMINOPHEN (NORCO) 5-325 MG PER TABLET    Take 1 tablet by mouth.    HYDROXYZINE HCL (ATARAX) 25 MG TABLET    Take 25 mg by mouth as needed.    LISINOPRIL (PRINIVIL,ZESTRIL) 40 MG TABLET    Take 40 mg by mouth once daily.    MAGNESIUM OXIDE (MAG-OX) 400 MG (241.3 MG MAGNESIUM) TABLET    Take 1 tablet by mouth 2 (two) times daily.    METFORMIN (GLUCOPHAGE) 1000 MG TABLET    Take 1,000 mg by mouth 2 (two) times daily with meals.    METOPROLOL TARTRATE (LOPRESSOR) 50 MG TABLET    Take by mouth.    SEMAGLUTIDE (OZEMPIC) 1 MG/DOSE (4 MG/3 ML)    Inject 1 mg into the skin every 7 days.        Objective:     /74 (BP Location: Left arm, Patient Position: Sitting)   Pulse 87   Resp 18   Ht 6' 2" " "(1.88 m)   Wt 97.9 kg (215 lb 14.4 oz)   SpO2 97%   BMI 27.72 kg/m²     Physical Exam  HENT:      Head: Normocephalic.      Nose: Nose normal.      Mouth/Throat:      Mouth: Mucous membranes are moist.   Eyes:      Pupils: Pupils are equal, round, and reactive to light.   Cardiovascular:      Rate and Rhythm: Normal rate and regular rhythm.   Pulmonary:      Effort: Pulmonary effort is normal.   Abdominal:      General: Abdomen is flat.      Palpations: Abdomen is soft.   Musculoskeletal:         General: Normal range of motion.      Cervical back: Normal range of motion.   Skin:     General: Skin is warm.      Capillary Refill: Capillary refill takes less than 2 seconds.   Neurological:      Mental Status: He is alert and oriented to person, place, and time.   Psychiatric:         Mood and Affect: Mood normal.          Labs:  CMP  Sodium   Date Value Ref Range Status   01/23/2023 141 135 - 145 mmol/L Final     Potassium   Date Value Ref Range Status   01/23/2023 4.2 3.6 - 5.2 mmol/L Final     Chloride   Date Value Ref Range Status   01/23/2023 107 100 - 108 mmol/L Final     CO2   Date Value Ref Range Status   01/23/2023 26 21 - 32 mmol/L Final     Glucose   Date Value Ref Range Status   01/23/2023 129 (H) 70 - 110 mg/dL Final     BUN   Date Value Ref Range Status   01/23/2023 19 (H) 7 - 18 mg/dL Final     Creatinine   Date Value Ref Range Status   01/23/2023 1.27 0.70 - 1.30 mg/dL Final     Calcium   Date Value Ref Range Status   01/23/2023 9.5 8.8 - 10.5 mg/dL Final     Anion Gap   Date Value Ref Range Status   01/23/2023 8.0 3.0 - 11.0 mmol/L Final      No results found for: "ALT", "AST", "GGT", "ALKPHOS", "BILITOT"   No results found for: "PT"  Lab Results   Component Value Date    WBC 7.1 01/23/2023    HGB 13.3 (L) 01/23/2023    HCT 38.6 (L) 01/23/2023    MCV 90.2 01/23/2023    LABPLAT 225 01/23/2023       No results found for: "BLOODTYPE"    Imaging:  No results found for this or any previous visit.     No " "results found for this or any previous visit.      X-Ray Chest PA And Lateral                                RADIOLOGY REPORT        PT NAME: ALEX ASHLEY      [facility]     : 1945 M 77             [address]    ACCT: [id number]                                              [place], [place]    [facility] Rec #: [id number]                                        [id number]    Patient Location: [place]/             Procedure: CHEST 2 VIEWS    REQUISITION #: [id number]      REPORT #: [id number]           DATE OF EXAM: 23    TIME OF EXAM:        EXAMINATION: Chest x-ray        HISTORY: Preop        COMPARISON: 2022        FINDINGS:        No pneumothorax, pleural effusion, or focal consolidation is seen. The right   hemidiaphragm is elevated.        The heart is unchanged in size.        No acute or aggressive bony lesions.        IMPRESSION:        No acute cardiopulmonary abnormality.        DICTATING PHYSICIAN:  ESTEFANÍA MONACO MD                   Date Dictated: 23        Signed By:  ESTEFANÍA MONACO MD <Electronically signed by ESTEFANÍA MONACO MD in OV>    Date Signed:  23     CC: JUNIE CASTAÑEDA MD ; JUNIE CASTAÑEDA MD      ADMITTING PHYSICIAN:                                                                                                    ORDERING PHY: JUNIE CASTAÑEDA MD                                                                                                                                                      ATTENDING PHY: JUNIE CASTAÑEDA MD    Patient Status:  PRE SDC    Admit Service Date: 23          No image results found.       No results found for this visit on 25.       Pulmonary Functions Testing Results:    No results found for: "FEV1", "FVC", "IDC4SYG", "TLC", "DLCO"         Assessment & Plan:     Severe symptomatic aortic stenosis by valve area  Coronary artery disease status post PCIs  Type 2 diabetes mellitus  Renal " mass  Pulmonary nodules    04/08/2025 Seen with Dr. Camejo. discussed TAVR versus SAVR.  We will have a heart team discussion. the patient is a candidate for both.  Patient has a appointment with Dr. Bello for renal mass.  He is scheduled to see Dr. Paiz on Friday.  We will send a routine referral to the pulmonary nodule clinic for follow up of pulmonary nodules.          Ajay Self NP   Cardiothoracic Surgery

## 2025-04-09 DIAGNOSIS — R93.421 ABNORMAL RADIOLOGIC FINDINGS ON DIAGNOSTIC IMAGING OF RIGHT KIDNEY: Primary | ICD-10-CM

## 2025-04-10 ENCOUNTER — OFFICE VISIT (OUTPATIENT)
Dept: UROLOGY | Facility: CLINIC | Age: 80
End: 2025-04-10
Payer: MEDICARE

## 2025-04-10 VITALS
HEART RATE: 60 BPM | HEIGHT: 74 IN | DIASTOLIC BLOOD PRESSURE: 71 MMHG | SYSTOLIC BLOOD PRESSURE: 118 MMHG | WEIGHT: 215 LBS | BODY MASS INDEX: 27.59 KG/M2

## 2025-04-10 DIAGNOSIS — R93.421 ABNORMAL RADIOLOGIC FINDINGS ON DIAGNOSTIC IMAGING OF RIGHT KIDNEY: ICD-10-CM

## 2025-04-10 DIAGNOSIS — N28.89 RENAL MASS: Primary | ICD-10-CM

## 2025-04-10 PROCEDURE — 99204 OFFICE O/P NEW MOD 45 MIN: CPT | Mod: S$PBB,,, | Performed by: UROLOGY

## 2025-04-10 RX ORDER — AMLODIPINE BESYLATE 5 MG/1
5 TABLET ORAL
COMMUNITY

## 2025-04-10 RX ORDER — SEMAGLUTIDE 1.34 MG/ML
0.5 INJECTION, SOLUTION SUBCUTANEOUS WEEKLY
COMMUNITY

## 2025-04-10 RX ORDER — MULTIVITAMIN
1 TABLET ORAL DAILY
COMMUNITY
Start: 2025-03-10

## 2025-04-10 RX ORDER — FENOFIBRATE 145 MG/1
145 TABLET, FILM COATED ORAL
COMMUNITY
Start: 2025-03-31

## 2025-04-10 RX ORDER — BENZONATATE 100 MG/1
100 CAPSULE ORAL
COMMUNITY
Start: 2025-03-31

## 2025-04-10 RX ORDER — ASPIRIN 81 MG/1
81 TABLET ORAL DAILY
COMMUNITY

## 2025-04-10 RX ORDER — FENOFIBRATE 160 MG/1
145 TABLET ORAL
COMMUNITY

## 2025-04-10 NOTE — PROGRESS NOTES
Subjective:       Patient ID: Ag Villalba is a 79 y.o. male.    Chief Complaint: kidney cancer      HPI:  79-year-old male in the midst of a cardiac workup he is planning for a CABG soon, was incidentally found to have a 1 point 5 cm right lateral renal mass I do not have the images as they are on a different system but the report states his 1.5 cm enhancing lesion in the lateral right kidney patient is asymptomatic    Past Medical History:   Past Medical History:   Diagnosis Date    Agent orange exposure     Aortic stenosis     Coronary artery disease     Diabetes mellitus     History of colonic polyps     HLD (hyperlipidemia)     Hx of degenerative disc disease     Hypertension        Past Surgical Historical:   Past Surgical History:   Procedure Laterality Date    BACK SURGERY      x3    CARDIAC CATHETERIZATION  03/2017    cardiac stent placment      CARPAL TUNNEL RELEASE Left 02/01/2023    With Exploration of Guyon's Canal    CATARACT EXTRACTION      CORONARY ANGIOPLASTY WITH STENT PLACEMENT  07/2016    distal RCA    CORONARY ANGIOPLASTY WITH STENT PLACEMENT  2022    LAD    CORONARY ANGIOPLASTY WITH STENT PLACEMENT      CORONARY STENT PLACEMENT      KNEE SURGERY Left     REPAIR, RETINAL DETACHMENT Left         Medications:   Medication List with Changes/Refills   Current Medications    ALBUTEROL (PROVENTIL/VENTOLIN HFA) 90 MCG/ACTUATION INHALER    Inhale 2 puffs into the lungs every 4 (four) hours as needed.    AMLODIPINE (NORVASC) 5 MG TABLET    Take 5 mg by mouth 2 (two) times daily.    AMLODIPINE (NORVASC) 5 MG TABLET    Take 5 mg by mouth.    ASPIRIN (ECOTRIN) 81 MG EC TABLET    Take 81 mg by mouth once daily.    ASPIRIN (ECOTRIN) 81 MG EC TABLET    Take 81 mg by mouth once daily.    ATORVASTATIN (LIPITOR) 80 MG TABLET    Take 80 mg by mouth once daily.    BENZONATATE (TESSALON) 100 MG CAPSULE    Take 100 mg by mouth.    CALCIUM-VITAMIN D 600 MG-10 MCG (400 UNIT) TAB    Take 1 tablet by mouth once daily.     CLOPIDOGREL (PLAVIX) 75 MG TABLET    Take 75 mg by mouth once daily.    FENOFIBRATE (TRICOR) 145 MG TABLET    Take 145 mg by mouth.    FENOFIBRATE (TRICOR) 48 MG TABLET    Take 145 mg by mouth once daily.    FENOFIBRATE 160 MG TAB    Take 145 mg by mouth.    GABAPENTIN (NEURONTIN) 300 MG CAPSULE    Take 600 mg by mouth.    HYDROCODONE-ACETAMINOPHEN (NORCO) 5-325 MG PER TABLET    Take 1 tablet by mouth.    HYDROXYZINE HCL (ATARAX) 25 MG TABLET    Take 25 mg by mouth as needed.    LISINOPRIL (PRINIVIL,ZESTRIL) 40 MG TABLET    Take 40 mg by mouth once daily.    MAGNESIUM OXIDE (MAG-OX) 400 MG (241.3 MG MAGNESIUM) TABLET    Take 1 tablet by mouth 2 (two) times daily.    METFORMIN (GLUCOPHAGE) 1000 MG TABLET    Take 1,000 mg by mouth 2 (two) times daily with meals.    METOPROLOL TARTRATE (LOPRESSOR) 50 MG TABLET    Take by mouth.    SEMAGLUTIDE (OZEMPIC) 0.25 MG OR 0.5 MG (2 MG/3 ML) PEN INJECTOR    Inject into the skin.    SEMAGLUTIDE (OZEMPIC) 0.25 MG OR 0.5 MG(2 MG/1.5 ML) PEN INJECTOR    Inject 0.5 mg into the skin once a week.    SEMAGLUTIDE (OZEMPIC) 1 MG/DOSE (4 MG/3 ML)    Inject 1 mg into the skin every 7 days.   Discontinued Medications    GLIMEPIRIDE (AMARYL) 1 MG TABLET    Take 1.5 mg by mouth.        Past Social History: Social History[1]    Allergies:   Review of patient's allergies indicates:   Allergen Reactions    Glimepiride Other (See Comments) and Nausea Only        Family History: No family history on file.     Review of Systems:  Review of Systems   Constitutional:  Negative for activity change and appetite change.   HENT:  Negative for congestion and dental problem.    Eyes:  Negative for visual disturbance.   Respiratory:  Negative for chest tightness and shortness of breath.    Cardiovascular:  Negative for chest pain.   Gastrointestinal:  Negative for abdominal distention and abdominal pain.   Genitourinary:  Negative for decreased urine volume, difficulty urinating, dysuria, enuresis, flank  pain, frequency, genital sores, hematuria, penile discharge, penile pain, penile swelling, scrotal swelling, testicular pain and urgency.   Musculoskeletal:  Negative for back pain and neck pain.   Skin:  Negative for color change.   Neurological:  Negative for dizziness.   Hematological:  Negative for adenopathy.   Psychiatric/Behavioral:  Negative for agitation, behavioral problems and confusion.        Physical Exam:  Physical Exam  Constitutional:       General: He is not in acute distress.     Appearance: He is well-developed.   HENT:      Head: Normocephalic and atraumatic.      Nose: Nose normal.   Eyes:      General: No scleral icterus.     Conjunctiva/sclera: Conjunctivae normal.      Pupils: Pupils are equal, round, and reactive to light.   Neck:      Thyroid: No thyromegaly.      Trachea: No tracheal deviation.   Cardiovascular:      Rate and Rhythm: Normal rate and regular rhythm.      Heart sounds: Normal heart sounds.   Pulmonary:      Effort: Pulmonary effort is normal. No respiratory distress.      Breath sounds: Normal breath sounds. No wheezing or rales.   Abdominal:      General: Bowel sounds are normal. There is no distension.      Palpations: Abdomen is soft.      Tenderness: There is no abdominal tenderness. There is no guarding or rebound.   Genitourinary:     Penis: Normal. No tenderness.       Prostate: Normal.   Musculoskeletal:         General: No deformity. Normal range of motion.      Cervical back: Neck supple.   Lymphadenopathy:      Cervical: No cervical adenopathy.   Skin:     General: Skin is warm and dry.      Findings: No erythema or rash.   Neurological:      Mental Status: He is alert and oriented to person, place, and time.      Cranial Nerves: No cranial nerve deficit.   Psychiatric:         Behavior: Behavior normal.         Assessment/Plan:       Problem List Items Addressed This Visit    None  Visit Diagnoses         Renal mass    -  Primary    Relevant Orders    CT Abdomen  Pelvis W Wo Contrast    Creatinine, serum      Abnormal radiologic findings on diagnostic imaging of right kidney                   79-year-old male with a incidentally discovered 1.5 cm right lateral renal mass consistent with malignancy he is preparing to have cardiac surgery I have informed him that it is safe to continue to monitor this until after his heart surgery is complete we will plan to perform a CT renal mass protocol in 4 months.  We discussed cryoablation which I feel that this lesion would be a good candidate for         [1]   Social History  Socioeconomic History    Marital status:    Tobacco Use    Smoking status: Never    Smokeless tobacco: Never   Substance and Sexual Activity    Alcohol use: Not Currently    Drug use: Never

## 2025-04-30 ENCOUNTER — TELEPHONE (OUTPATIENT)
Dept: UROLOGY | Facility: CLINIC | Age: 80
End: 2025-04-30
Payer: OTHER GOVERNMENT

## 2025-04-30 NOTE — TELEPHONE ENCOUNTER
Spoke with pt and advised pt to proceed to radiology department @ [facility] and they will be able to make a CD with his images. Pt states he is currently in the hospital after valve replacement. I asked pt to ask his nurse if she is able to help with this.     ----- Message from Jennifer sent at 4/30/2025  3:00 PM CDT -----  Contact: Dr. Sheriff office  ..Type:  Patient Requesting CallWho Called: Dr. Sheriff officeWhat is the call regarding?: they would like to have the CT results burnt onto a disk if possible.Would the patient rather a call back or a response via [facility]?  callBest Call Back Number: [phone] Information:

## 2025-05-14 DIAGNOSIS — R91.8 LUNG NODULES: Primary | ICD-10-CM
